# Patient Record
Sex: FEMALE
[De-identification: names, ages, dates, MRNs, and addresses within clinical notes are randomized per-mention and may not be internally consistent; named-entity substitution may affect disease eponyms.]

---

## 2018-06-15 NOTE — EDM.PDOC
ED HPI GENERAL MEDICAL PROBLEM





- General


Chief Complaint: General


Stated Complaint: PAIN UNDER BREAST


Time Seen by Provider: 06/15/18 20:08


Source of Information: Reports: Patient


History Limitations: Reports: No Limitations





- History of Present Illness


INITIAL COMMENTS - FREE TEXT/NARRATIVE: 





HISTORY AND PHYSICAL:





History of present illness:


Patient is a 33-year-old female who presents to the emergency room with 

complaints of pain under the left breast post fall. She states 3 days ago she 

had fallen on the floor, hitting her anterior chest. Since that time has had 

increased pain under the left breast. Pain with palpation to the area and with 

twisting movements. Denies hitting her head or any LOC. She denies any fever, 

chills, chest pain with cough or shortness of breath. Denies any abdominal pain

, nausea, vomiting, diarrhea or constipation.





Review of systems: 


As per history of present illness and below otherwise all systems reviewed and 

negative.





Past medical history: 


As per history of present illness and as reviewed below otherwise 

noncontributory.





Surgical history: 


As per history of present illness and as reviewed below otherwise 

noncontributory.





Social history: 


No reported history of drug or alcohol abuse.





Family history: 


As per history of present illness and as reviewed below otherwise 

noncontributory.





Physical exam:


General: Developed and well-nourished 33-year-old female. Alert and oriented. 

Nontoxic appearing and in no acute distress.


HEENT: Atraumatic, normocephalic, pupils equal and reactive bilaterally, 

negative for conjunctival pallor or scleral icterus, mucous membranes moist, 

throat clear, neck supple, nontender, trachea midline. No drooling or trismus 

noted. No meningeal signs


Lungs: Clear to auscultation, breath sounds equal bilaterally, chest nontender.


Heart: S1S2, regular rate and rhythm without overt murmur


Abdomen: Soft, nondistended, nontender. Negative for masses or 

hepatosplenomegaly. Negative for costovertebral tenderness.


Breast: A breast exam was done with consent. I did explain the exam prior to 

performing. No nipple discharge. There is no open skin, lesions or masses noted 

with manual exam. She does have tenderness under the breast along the chest 

wall on the left.


Pelvis: Stable nontender.


Genitourinary: Deferred.


Rectal: Deferred.


Skin: Intact, warm, dry. No lesions or rashes noted.


Extremities: Atraumatic, negative for cords or calf pain. Neurovascular 

unremarkable.


Neuro: Awake, alert, oriented. Cranial nerves II through XII unremarkable. 

Cerebellum unremarkable. Motor and sensory unremarkable throughout. Exam 

nonfocal.





Notes:


The breast itself does not appear to have any abnormalities. Her pain is with 

palpation under the breast tissue along the anterior left chest wall. Chest x-

ray shows no rib fractures, pneumonia or infiltrate. Norco for night time use, 

and Diclofenac for daytime use was given. Medication education was given. She 

voices understanding and is agreeable to plan of care. She denies any further 

questions at this time.





Diagnostics:


CXR





Therapeutics:


Toradol





Impression: 


Rib contusion





Plan:


1. Continue to take deep breaths/coughing 4-5 x daily to prevent a pneumonia 

from developing.


2. Tylenol and/or ibuprofen as needed for pain management. Norco may be 

prescribed for moderate to severe pain. Please do not take this medication 

while driving or needing to be functioning outside the house. You may ice to 

the area he minutes on 15 minutes off.


3. Follow-up with your primary caregiver in the next 1-2 days. Return to the ED 

as needed and as discussed.





Definitive disposition and diagnosis as appropriate pending reevaluation and 

review of above.





Duration: Day(s):


Location: Reports: Chest





- Related Data


 Allergies











Allergy/AdvReac Type Severity Reaction Status Date / Time


 


No Known Allergies Allergy   Verified 06/15/18 20:10











Home Meds: 


 Home Meds





. [No Known Home Meds]  06/15/18 [History]











Past Medical History





- Past Health History


Medical/Surgical History: Denies Medical/Surgical History





Social & Family History





- Tobacco Use


Smoking Status *Q: Never Smoker





ED ROS GENERAL





- Review of Systems


Review Of Systems: ROS reveals no pertinent complaints other than HPI.





ED EXAM, GENERAL





- Physical Exam


Exam: See Below (See dictation)





Course





- Vital Signs


Last Recorded V/S: 


 Last Vital Signs











Temp  97.3 F   06/15/18 20:14


 


Pulse  111 H  06/15/18 20:14


 


Resp  16   06/15/18 20:14


 


BP  128/72   06/15/18 20:14


 


Pulse Ox  100   06/15/18 20:14














- Orders/Labs/Meds


Orders: 


 Active Orders 24 hr











 Category Date Time Status


 


 Chest 2V [CR] Stat Exams  06/15/18 20:26 Ordered











Meds: 


Medications














Discontinued Medications














Generic Name Dose Route Start Last Admin





  Trade Name Jessie  PRN Reason Stop Dose Admin


 


Ketorolac Tromethamine  60 mg  06/15/18 20:27  06/15/18 20:31





  Toradol  IM  06/15/18 20:28  60 mg





  ONETIME ONE   Administration





     





     





     





     














Departure





- Departure


Time of Disposition: 20:57


Disposition: Home, Self-Care 01


Clinical Impression: 


Rib contusion


Qualifiers:


 Encounter type: initial encounter Laterality: left Qualified Code(s): S20.212A 

- Contusion of left front wall of thorax, initial encounter








- Discharge Information


Referrals: 


PCP,None [Primary Care Provider] - 


Forms:  ED Department Discharge


Additional Instructions: 


The following information is given to patients seen in the emergency department 

who are being discharged to home. This information is to outline your options 

for follow-up care. We provide all patients seen in our emergency department 

with a follow-up referral.





The need for follow-up, as well as the timing and circumstances, are variable 

depending upon the specifics of your emergency department visit.





If you don't have a primary care physician on staff, we will provide you with a 

referral. We always advise you to contact your personal physician following an 

emergency department visit to inform them of the circumstance of the visit and 

for follow-up with them and/or the need for any referrals to a consulting 

specialist.





The emergency department will also refer you to a specialist when appropriate. 

This referral assures that you have the opportunity for follow-up care with a 

specialist. All of these measure are taken in an effort to provide you with 

optimal care, which includes your follow-up.





Under all circumstances we always encourage you to contact your private 

physician who remains a resource for coordinating your care. When calling for 

follow-up care, please make the office aware that this follow-up is from your 

recent emergency room visit. If for any reason you are refused follow-up, 

please contact the [facility] Emergency 

Department at [phone] and asked to speak to the emergency department 

charge nurse.





[facility]


Primary Care


[address] [place] [address]


Phone: [phone]


Fax: [phone]





1. Continue to take deep breaths/coughing 4-5 x daily to prevent a pneumonia 

from developing.


2. Tylenol and/or ibuprofen as needed for pain management. Norco may be 

prescribed for moderate to severe pain. Please do not take this medication 

while driving or needing to be functioning outside the house. You may ice to 

the area he minutes on 15 minutes off.


3. Follow-up with your primary caregiver in the next 1-2 days. Return to the ED 

as needed and as discussed.





- My Orders


Last 24 Hours: 


My Active Orders





06/15/18 20:26


Chest 2V [CR] Stat 














- Assessment/Plan


Last 24 Hours: 


My Active Orders





06/15/18 20:26


Chest 2V [CR] Stat

## 2018-06-18 NOTE — CR
EXAM DATE: 06/15/18



PATIENT'S AGE: 33



Patient: NILA BIANCHI



Facility: [place], [place]

Patient ID: [id number]

Site Patient ID: [id number].

Site Accession #: [id number]

: 1985

Study: XRay Chest [id number]/15/2018 8:50:11 PM

Ordering Physician: Doctor Temp



Final Report: 

INDICATION: LEFT CHEST WALL PAIN POST FALL, NEAR BREAST



TECHNIQUE:

Chest 2 views



COMPARISON:

None 



FINDINGS:

Cardiovascular and mediastinum: Heart size and vasculature are normal in 
caliber and appearance. Mediastinum is within normal limits. 



Lungs and pleural spaces: No focal consolidation. No sign of pleural effusion. 
No pneumothorax. 



Bones and soft tissues: No significant findings. 



IMPRESSION:

No acute cardiopulmonary disease.



Dictated by Jose Flowers MD @ 6/15/2018 8:56:39 PM







Dictated by: Jose Flowers MD @ 06/15/2018 20:58:05

(Electronic Signature)



Report Signed by Proxy.
CHACORTA

## 2019-01-28 NOTE — EDM.PDOC
ED HPI GENERAL MEDICAL PROBLEM





- General


Chief Complaint: General


Stated Complaint: DIZZY, BLOOD PRESSURE ISSUES


Time Seen by Provider: 01/28/19 20:07


Source of Information: Reports: Patient


History Limitations: Reports: No Limitations





- History of Present Illness


INITIAL COMMENTS - FREE TEXT/NARRATIVE: 





HISTORY AND PHYSICAL:





History of present illness:


Patient is a 33-year-old female here with complaint of dizziness that began 

about 1 hour prior to arrival to the ED. She states it has since improved. She 

reports she had dizziness, blurry vision, and chest pain. The chest pain and 

blurry vision resolved and now she is feeling a little dizzy. She states it is 

worse with movement. She denies any recent illness, fevers, chills, nausea, 

vomiting, diarrhea, abdominal pain, headache, or head injury. She denies any 

significant past medical history. 





Review of systems: 


As per history of present illness and below otherwise all systems reviewed and 

negative.





Past medical history: 


As per history of present illness and as reviewed below otherwise 

noncontributory.





Surgical history: 


As per history of present illness and as reviewed below otherwise 

noncontributory.





Social history: 


No reported history of drug or alcohol abuse.





Family history: 


As per history of present illness and as reviewed below otherwise 

noncontributory.





Physical exam:


General: Patient sitting comfortably in no acute distress and nontoxic appearing


HEENT: Atraumatic, normocephalic, pupils reactive, negative for conjunctival 

pallor or scleral icterus, mucous membranes moist, throat clear, neck supple, 

nontender, trachea midline. No meningeal signs. 


Lungs: Clear to auscultation, breath sounds equal bilaterally, chest nontender.


Heart: S1S2, regular, negative for clicks, rubs, or overt murmur.


Abdomen: Soft, nondistended, nontender. Negative for masses or 

hepatosplenomegaly. Negative for costovertebral tenderness.


Pelvis: Stable nontender.


Genitourinary: Deferred.


Rectal: Deferred.


Extremities: Atraumatic, negative for cords or calf pain. Neurovascular 

unremarkable.


Neuro: Awake, alert, oriented. Cranial nerves II through XII unremarkable. 

Cerebellum unremarkable. Motor and sensory unremarkable throughout. Exam 

nonfocal.





Notes: 





Diagnostics:


CBC, CMP, troponin, urine hcg, EKG





Therapeutics:


None 





Prescriptions:


None 





Impression: 


Vertigo





Plan:


1. Follow up with primary care provider


2. Return to ED as needed as discussed 





Definitive disposition and diagnosis as appropriate pending reevaluation and 

review of above.





  ** left shoulder


Pain Score (Numeric/FACES): 2





- Related Data


 Allergies











Allergy/AdvReac Type Severity Reaction Status Date / Time


 


No Known Allergies Allergy   Verified 01/28/19 20:02











Home Meds: 


 Home Meds





. [No Known Home Meds]  06/15/18 [History]











Past Medical History





- Past Health History


Medical/Surgical History: Denies Medical/Surgical History


OB/GYN History: Reports: Pregnancy





- Past Surgical History


Female  Surgical History: Reports: Tubal Ligation





Social & Family History





- Family History


Family Medical History: Noncontributory





- Tobacco Use


Smoking Status *Q: Never Smoker





- Recreational Drug Use


Recreational Drug Use: Yes


Drug Use in Last 12 Months: Yes


Recreational Drug Type: Reports: Marijuana/Hashish


Recreational Drug Use Frequency: Socially





ED ROS GENERAL





- Review of Systems


Review Of Systems: ROS reveals no pertinent complaints other than HPI.





ED EXAM, GENERAL





- Physical Exam


Exam: See Below (see dictation)





Course





- Vital Signs


Last Recorded V/S: 


 Last Vital Signs











Temp  97 F   01/28/19 19:44


 


Pulse  103 H  01/28/19 19:44


 


Resp  18   01/28/19 19:44


 


BP  178/87 H  01/28/19 19:44


 


Pulse Ox  94 L  01/28/19 19:44








 





Orthostatic Blood Pressure [     146/84


Standing]                        


Orthostatic Blood Pressure [     138/78


Sitting]                         


Orthostatic Blood Pressure [     142/88


Supine]                          











- Orders/Labs/Meds


Orders: 


 Active Orders 24 hr











 Category Date Time Status


 


 EKG Documentation Completion [RC] STAT Care  01/28/19 20:06 Active











Labs: 


 Laboratory Tests











  01/28/19 01/28/19 01/28/19 Range/Units





  20:06 20:22 20:22 


 


WBC   7.97   (4.0-11.0)  K/uL


 


RBC   4.69   (4.30-5.90)  M/uL


 


Hgb   13.3   (12.0-16.0)  g/dL


 


Hct   39.3   (36.0-46.0)  %


 


MCV   83.8   (80.0-98.0)  fL


 


MCH   28.4   (27.0-32.0)  pg


 


MCHC   33.8   (31.0-37.0)  g/dL


 


RDW Std Deviation   42.3   (28.0-62.0)  fl


 


RDW Coeff of Dennis   14   (11.0-15.0)  %


 


Plt Count   307   (150-400)  K/uL


 


MPV   10.60   (7.40-12.00)  fL


 


Neut % (Auto)   71.0   (48.0-80.0)  %


 


Lymph % (Auto)   20.7   (16.0-40.0)  %


 


Mono % (Auto)   5.6   (0.0-15.0)  %


 


Eos % (Auto)   2.1   (0.0-7.0)  %


 


Baso % (Auto)   0.6   (0.0-1.5)  %


 


Neut # (Auto)   5.7   (1.4-5.7)  K/uL


 


Lymph # (Auto)   1.7   (0.6-2.4)  K/uL


 


Mono # (Auto)   0.5   (0.0-0.8)  K/uL


 


Eos # (Auto)   0.2   (0.0-0.7)  K/uL


 


Baso # (Auto)   0.1   (0.0-0.1)  K/uL


 


Nucleated RBC %   0.0   /100WBC


 


Nucleated RBCs #   0   K/uL


 


Sodium    140  (136-145)  mmol/L


 


Potassium    4.0  (3.5-5.1)  mmol/L


 


Chloride    103  ()  mmol/L


 


Carbon Dioxide    24.2  (21.0-32.0)  mmol/L


 


BUN    13  (7.0-18.0)  mg/dL


 


Creatinine    0.6  (0.6-1.0)  mg/dL


 


Est Cr Clr Drug Dosing    122.51  mL/min


 


Estimated GFR (MDRD)    > 60.0  ml/min


 


Glucose    130 H  ()  mg/dL


 


Calcium    9.6  (8.5-10.1)  mg/dL


 


Total Bilirubin    0.7  (0.2-1.0)  mg/dL


 


AST    22  (15-37)  IU/L


 


ALT    39  (14-63)  IU/L


 


Alkaline Phosphatase    117 H  ()  U/L


 


Troponin I    < 0.050  (0.000-0.056)  ng/mL


 


Total Protein    8.0  (6.4-8.2)  g/dL


 


Albumin    4.1  (3.4-5.0)  g/dL


 


Globulin    3.9  (2.6-4.0)  g/dL


 


Albumin/Globulin Ratio    1.1  (0.9-1.6)  


 


Urine HCG, Qual  NEGATIVE    (NEGATIVE)  














Departure





- Departure


Time of Disposition: 21:08


Disposition: Home, Self-Care 01


Condition: Good


Clinical Impression: 


 Vertigo








- Discharge Information


Referrals: 


PCP,None [Primary Care Provider] - 


Forms:  ED Department Discharge


Additional Instructions: 


The following information is given to patients seen in the emergency department 

who are being discharged to home. This information is to outline your options 

for follow-up care. We provide all patients seen in our emergency department 

with a follow-up referral.





The need for follow-up, as well as the timing and circumstances, are variable 

depending upon the specifics of your emergency department visit.





If you don't have a primary care physician on staff, we will provide you with a 

referral. We always advise you to contact your personal physician following an 

emergency department visit to inform them of the circumstance of the visit and 

for follow-up with them and/or the need for any referrals to a consulting 

specialist.





The emergency department will also refer you to a specialist when appropriate. 

This referral assures that you have the opportunity for follow-up care with a 

specialist. All of these measure are taken in an effort to provide you with 

optimal care, which includes your follow-up.





Under all circumstances we always encourage you to contact your private 

physician who remains a resource for coordinating your care. When calling for 

follow-up care, please make the office aware that this follow-up is from your 

recent emergency room visit. If for any reason you are refused follow-up, 

please contact the [facility] Emergency 

Department at [phone] and asked to speak to the emergency department 

charge nurse.





[facility]


Primary Care


[address] [place] [id number]


Phone: [phone]


Fax: [phone]





[address] [address]


Phone: [phone]


Fax: [phone]





1. Follow up with primary care provider


2. Return to ED as needed as discussed 





- My Orders


Last 24 Hours: 


My Active Orders





01/28/19 20:06


EKG Documentation Completion [RC] STAT 














- Assessment/Plan


Last 24 Hours: 


My Active Orders





01/28/19 20:06


EKG Documentation Completion [RC] STAT

## 2019-07-14 NOTE — EDM.PDOC
ED HPI GENERAL MEDICAL PROBLEM





- General


Stated Complaint: WITHDRAWL


Time Seen by Provider: 07/14/19 21:55


Source of Information: Reports: Patient





- History of Present Illness


INITIAL COMMENTS - FREE TEXT/NARRATIVE: 





HISTORY AND PHYSICAL:


History of present illness:


[Patient presents with panic and hyperventilation symptoms, she is very anxious 

at current no fever nausea vomiting chills sweats no chest pain shortness 

breath headache dizziness palpitation about a urine symptoms





She was provided 2 mg of Ativan IV currently resting comfortably in no distress

, remains alert





Patient admits to 10-15 shots of hard alcohol daily, she has not had a drink in 

24 hours, no prior withdrawal history


]


Review of systems: 


As per history of present illness and below otherwise all systems reviewed and 

negative.


Past medical history: 


As per history of present illness and as reviewed below otherwise 

noncontributory.


Surgical history: 


As per history of present illness and as reviewed below otherwise 

noncontributory.


Social history: 


No reported history of drug or alcohol abuse.


Family history: 


As per history of present illness and as reviewed below otherwise 

noncontributory.





Physical exam:


HEENT: Atraumatic, normocephalic, pupils reactive, negative for conjunctival 

pallor or scleral icterus, mucous membranes moist, throat clear, neck supple, 

nontender, trachea midline.


Lungs: Clear to auscultation, breath sounds equal bilaterally, chest nontender.


Heart: S1S2, regular, negative for clicks, rubs, or JVD.


Abdomen: Soft, nondistended, nontender. Negative for masses or 

hepatosplenomegaly. Negative for costovertebral tenderness.


Pelvis: Stable nontender.


Genitourinary: Deferred.


Rectal: Deferred.


Extremities: Atraumatic, negative for cords or calf pain. Neurovascular 

unremarkable.


Neuro: Awake, alert, oriented. Cranial nerves II through XII unremarkable. 

Cerebellum unremarkable. Motor and sensory unremarkable throughout. Exam 

nonfocal.





Diagnostics:


[cbc CMP UA hCG


EKG


Chest 1 view


]


Therapeutics:


[ banana bag


Ativan 2 mg IV


]


Impression: 


[ panic attack ]


Alcohol use abuse no history of prior withdrawal


Definitive disposition and diagnosis as appropriate pending reevaluation and 

review of above.





- Related Data


 Allergies











Allergy/AdvReac Type Severity Reaction Status Date / Time


 


No Known Allergies Allergy   Verified 07/14/19 22:07











Home Meds: 


 Home Meds





. [No Known Home Meds]  06/15/18 [History]











Past Medical History





- Past Health History


Medical/Surgical History: Denies Medical/Surgical History


OB/GYN History: Reports: Pregnancy





- Past Surgical History


Female  Surgical History: Reports: Tubal Ligation





Social & Family History





- Family History


Family Medical History: Noncontributory





ED ROS GENERAL





- Review of Systems


Review Of Systems: See Below





ED EXAM, GENERAL





- Physical Exam


Exam: See Below





Course





- Vital Signs


Last Recorded V/S: 


 Last Vital Signs











Temp  96.6 F   07/14/19 21:47


 


Pulse  105 H  07/14/19 21:47


 


Resp  22 H  07/14/19 21:47


 


BP  185/167 H  07/14/19 21:47


 


Pulse Ox  94 L  07/14/19 21:47














- Orders/Labs/Meds


Orders: 


 Active Orders 24 hr











 Category Date Time Status


 


 EKG Documentation Completion [RC] STAT Care  07/14/19 21:55 Active


 


 Chest 1V Frontal [CR] Stat Exams  07/14/19 21:55 Stop Req











Labs: 


 Laboratory Tests











  07/14/19 07/14/19 07/14/19 Range/Units





  22:02 22:02 22:02 


 


WBC  7.26    (4.0-11.0)  K/uL


 


RBC  4.24 L    (4.30-5.90)  M/uL


 


Hgb  12.1    (12.0-16.0)  g/dL


 


Hct  36.4    (36.0-46.0)  %


 


MCV  85.8    (80.0-98.0)  fL


 


MCH  28.5    (27.0-32.0)  pg


 


MCHC  33.2    (31.0-37.0)  g/dL


 


RDW Std Deviation  45.2    (28.0-62.0)  fl


 


RDW Coeff of Dennis  15    (11.0-15.0)  %


 


Plt Count  341    (150-400)  K/uL


 


MPV  10.40    (7.40-12.00)  fL


 


Neut % (Auto)  57.6    (48.0-80.0)  %


 


Lymph % (Auto)  33.6    (16.0-40.0)  %


 


Mono % (Auto)  6.1    (0.0-15.0)  %


 


Eos % (Auto)  2.1    (0.0-7.0)  %


 


Baso % (Auto)  0.6    (0.0-1.5)  %


 


Neut # (Auto)  4.2    (1.4-5.7)  K/uL


 


Lymph # (Auto)  2.4    (0.6-2.4)  K/uL


 


Mono # (Auto)  0.4    (0.0-0.8)  K/uL


 


Eos # (Auto)  0.2    (0.0-0.7)  K/uL


 


Baso # (Auto)  0.0    (0.0-0.1)  K/uL


 


Nucleated RBC %  0.0    /100WBC


 


Nucleated RBCs #  0    K/uL


 


INR    1.04  


 


Sodium   136   (136-145)  mmol/L


 


Potassium   3.3 L   (3.5-5.1)  mmol/L


 


Chloride   100   ()  mmol/L


 


Carbon Dioxide   19.3 L   (21.0-32.0)  mmol/L


 


BUN   7   (7.0-18.0)  mg/dL


 


Creatinine   0.9   (0.6-1.0)  mg/dL


 


Est Cr Clr Drug Dosing   65.21   mL/min


 


Estimated GFR (MDRD)   > 60.0   ml/min


 


Glucose   163 H   ()  mg/dL


 


Calcium   8.1 L   (8.5-10.1)  mg/dL


 


Total Bilirubin   0.7   (0.2-1.0)  mg/dL


 


AST   21   (15-37)  IU/L


 


ALT   29   (14-63)  IU/L


 


Alkaline Phosphatase   89   ()  U/L


 


Total Protein   7.4   (6.4-8.2)  g/dL


 


Albumin   3.9   (3.4-5.0)  g/dL


 


Globulin   3.5   (2.6-4.0)  g/dL


 


Albumin/Globulin Ratio   1.1   (0.9-1.6)  


 


Urine Color     


 


Urine Appearance     


 


Urine pH     (5.0-8.0)  


 


Ur Specific Gravity     (1.001-1.035)  


 


Urine Protein     (NEGATIVE)  mg/dL


 


Urine Glucose (UA)     (NEGATIVE)  mg/dL


 


Urine Ketones     (NEGATIVE)  mg/dL


 


Urine Occult Blood     (NEGATIVE)  


 


Urine Nitrite     (NEGATIVE)  


 


Urine Bilirubin     (NEGATIVE)  


 


Urine Urobilinogen     (<2.0)  EU/dL


 


Ur Leukocyte Esterase     (NEGATIVE)  


 


Urine HCG, Qual     (NEGATIVE)  














  07/14/19 07/14/19 Range/Units





  22:45 22:45 


 


WBC    (4.0-11.0)  K/uL


 


RBC    (4.30-5.90)  M/uL


 


Hgb    (12.0-16.0)  g/dL


 


Hct    (36.0-46.0)  %


 


MCV    (80.0-98.0)  fL


 


MCH    (27.0-32.0)  pg


 


MCHC    (31.0-37.0)  g/dL


 


RDW Std Deviation    (28.0-62.0)  fl


 


RDW Coeff of Dennis    (11.0-15.0)  %


 


Plt Count    (150-400)  K/uL


 


MPV    (7.40-12.00)  fL


 


Neut % (Auto)    (48.0-80.0)  %


 


Lymph % (Auto)    (16.0-40.0)  %


 


Mono % (Auto)    (0.0-15.0)  %


 


Eos % (Auto)    (0.0-7.0)  %


 


Baso % (Auto)    (0.0-1.5)  %


 


Neut # (Auto)    (1.4-5.7)  K/uL


 


Lymph # (Auto)    (0.6-2.4)  K/uL


 


Mono # (Auto)    (0.0-0.8)  K/uL


 


Eos # (Auto)    (0.0-0.7)  K/uL


 


Baso # (Auto)    (0.0-0.1)  K/uL


 


Nucleated RBC %    /100WBC


 


Nucleated RBCs #    K/uL


 


INR    


 


Sodium    (136-145)  mmol/L


 


Potassium    (3.5-5.1)  mmol/L


 


Chloride    ()  mmol/L


 


Carbon Dioxide    (21.0-32.0)  mmol/L


 


BUN    (7.0-18.0)  mg/dL


 


Creatinine    (0.6-1.0)  mg/dL


 


Est Cr Clr Drug Dosing    mL/min


 


Estimated GFR (MDRD)    ml/min


 


Glucose    ()  mg/dL


 


Calcium    (8.5-10.1)  mg/dL


 


Total Bilirubin    (0.2-1.0)  mg/dL


 


AST    (15-37)  IU/L


 


ALT    (14-63)  IU/L


 


Alkaline Phosphatase    ()  U/L


 


Total Protein    (6.4-8.2)  g/dL


 


Albumin    (3.4-5.0)  g/dL


 


Globulin    (2.6-4.0)  g/dL


 


Albumin/Globulin Ratio    (0.9-1.6)  


 


Urine Color  YELLOW   


 


Urine Appearance  CLEAR   


 


Urine pH  7.0   (5.0-8.0)  


 


Ur Specific Gravity  1.010   (1.001-1.035)  


 


Urine Protein  NEGATIVE   (NEGATIVE)  mg/dL


 


Urine Glucose (UA)  NEGATIVE   (NEGATIVE)  mg/dL


 


Urine Ketones  NEGATIVE   (NEGATIVE)  mg/dL


 


Urine Occult Blood  NEGATIVE   (NEGATIVE)  


 


Urine Nitrite  NEGATIVE   (NEGATIVE)  


 


Urine Bilirubin  NEGATIVE   (NEGATIVE)  


 


Urine Urobilinogen  0.2   (<2.0)  EU/dL


 


Ur Leukocyte Esterase  NEGATIVE   (NEGATIVE)  


 


Urine HCG, Qual   NEGATIVE  (NEGATIVE)  











Meds: 


Medications














Discontinued Medications














Generic Name Dose Route Start Last Admin





  Trade Name Freq  PRN Reason Stop Dose Admin


 


Multivitamins/Minerals 10 ml/  1,011.2 mls @ 999 mls/hr  07/14/19 21:53  07/14/ 19 22:49





Thiamine HCl 100 mg/ Folic  IV  07/14/19 22:53  999 mls/hr





Acid 1 mg/ Sodium Chloride  ONETIME ONE   Administration





     





     





     





     


 


Lorazepam  2 mg  07/14/19 21:54  07/14/19 22:20





  Ativan  IVPUSH  07/14/19 21:55  2 mg





  ONETIME ONE   Administration





     





     





     





     














Departure





- Departure


Time of Disposition: 23:09


Disposition: Home, Self-Care 01


Condition: Good


Clinical Impression: 


 Alcohol abuse, Panic attack








- Discharge Information


Referrals: 


PCP,None [Primary Care Provider] - 


Additional Instructions: 


The following information is given to patients seen in the emergency department 

who are being discharged to home. This information is to outline your options 

for follow-up care. We provide all patients seen in our emergency department 

with a follow-up referral.





The need for follow-up, as well as the timing and circumstances, are variable 

depending upon the specifics of your emergency department visit.





If you don't have a primary care physician on staff, we will provide you with a 

referral. We always advise you to contact your personal physician following an 

emergency department visit to inform them of the circumstance of the visit and 

for follow-up with them and/or the need for any referrals to a consulting 

specialist.





The emergency department will also refer you to a specialist when appropriate. 

This referral assures that you have the opportunity for follow-up care with a 

specialist. All of these measure are taken in an effort to provide you with 

optimal care, which includes your follow-up.





Under all circumstances we always encourage you to contact your private 

physician who remains a resource for coordinating your care. When calling for 

follow-up care, please make the office aware that this follow-up is from your 

recent emergency room visit. If for any reason you are refused follow-up, 

please contact the [facility] emergency department at [phone] 

and asked to speak to the emergency department charge nurse.








- My Orders


Last 24 Hours: 


My Active Orders





07/14/19 21:55


EKG Documentation Completion [RC] STAT 


Chest 1V Frontal [CR] Stat 














- Assessment/Plan


Last 24 Hours: 


My Active Orders





07/14/19 21:55


EKG Documentation Completion [RC] STAT 


Chest 1V Frontal [CR] Stat

## 2019-08-04 ENCOUNTER — HOSPITAL ENCOUNTER (EMERGENCY)
Dept: HOSPITAL 56 - MW.ED | Age: 34
Discharge: HOME | End: 2019-08-04
Payer: COMMERCIAL

## 2019-08-04 DIAGNOSIS — S89.92XA: Primary | ICD-10-CM
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
## 2019-08-04 PROCEDURE — 73562 X-RAY EXAM OF KNEE 3: CPT

## 2019-08-04 PROCEDURE — 99283 EMERGENCY DEPT VISIT LOW MDM: CPT

## 2019-08-04 NOTE — CR
Indication:



  Twisting injury 



Technique:



Three views left knee



Comparison:



None



Findings:



Bones: Alignment is normal. No fractures or bone lesions.  



Joint spaces: Unremarkable.  



Soft tissues: Unremarkable.  



Impression:



Negative.



Dictated by Charity Joseph MD @ Aug  4 2019 10:58PM



Signed by Dr. Charity Joseph @ Aug  4 2019 10:59PM

## 2019-08-04 NOTE — EDM.PDOC
ED HPI GENERAL MEDICAL PROBLEM





- General


Stated Complaint: TWISTED ANKLE


Time Seen by Provider: 08/04/19 22:04


Source of Information: Reports: Patient


History Limitations: Reports: No Limitations





- History of Present Illness


INITIAL COMMENTS - FREE TEXT/NARRATIVE: 


HISTORY AND PHYSICAL:





History of present illness:


Patient is a 34-year-old female who presents to the emergency room with 

complaints of left knee pain. She states she was bowling when she twisted and 

heard a "pop" of her left knee. Since then she has not been able to weight bear 

comfortably. Denies hitting her head or having any loss of consciousness. 

Denies any other extremity involvement. Patient denies any fever, chills, 

headache, change in vision, syncope or near syncope. Denies any chest pain, 

back pain, shortness of breath or cough. Denies any GI or  symptoms.





Review of systems: 


As per history of present illness and below otherwise all systems reviewed and 

negative.





Past medical history: 


As per history of present illness and as reviewed below otherwise 

noncontributory.





Surgical history: 


As per history of present illness and as reviewed below otherwise 

noncontributory.





Social history: 


See social history for further information





Family history: 


As per history of present illness and as reviewed below otherwise 

noncontributory.





Physical exam:


General: Well-developed and well-nourished 34-year-old female. Alert and 

oriented. Nontoxic appearing and in no acute distress. Vital signs are stable 

and have been reviewed by me.


HEENT: Atraumatic, normocephalic, pupils equal and reactive bilaterally, 

negative for conjunctival pallor or scleral icterus, mucous membranes moist, 

TMs normal bilaterally, throat clear, neck supple, nontender, trachea midline. 

No drooling or trismus noted. No meningeal signs. No hot potato voice noted. 


Lungs: Clear to auscultation, breath sounds equal bilaterally, chest nontender.


Heart: S1S2, regular rate and rhythm without overt murmur


Abdomen: Soft, nondistended, nontender.


Skin: Intact, warm, dry. No lesions or rashes noted.


Extremities:  Pain with palpation of the medial and lateral left knee. Moves 

all extremities per self without difficulty or deficits, negative for cords or 

calf pain. Strong pedal pulses bilaterally. Denies any numbness or tingling of 

the distal extremity. Neurovascular unremarkable.


Neuro: Awake, alert, oriented. Cranial nerves II through XII unremarkable. 

Cerebellum unremarkable. Motor and sensory unremarkable throughout. Exam 

nonfocal.





Notes:


X-ray shows no acute findings. Patient provided with a knee sleeve and 

crutches. Supportive care measures were reviewed and discussed. Voices 

understanding and is agreeable to plan of care. Denies any further questions or 

concerns at this time.





Diagnostics:


X-ray





Therapeutics:


Tramadol, crutches





Prescription:


None





Impression: 


Left knee injury





Plan:


1. Rest, ice, elevate the affected extremity. Please use crutches as directed.


2. Tylenol and/or Ibuprofen as needed for pain management. 


3. Follow up with the Orthopedic provider as we discussed. Return to the ED as 

needed and as discussed.





Definitive disposition and diagnosis as appropriate pending reevaluation and 

review of above.





  ** Left Knee


Pain Score (Numeric/FACES): 2





- Related Data


 Allergies











Allergy/AdvReac Type Severity Reaction Status Date / Time


 


No Known Allergies Allergy   Verified 08/04/19 22:16











Home Meds: 


 Home Meds





. [No Known Home Meds]  06/15/18 [History]











Past Medical History





- Past Health History


Medical/Surgical History: Denies Medical/Surgical History


HEENT History: Reports: None


Cardiovascular History: Reports: None


Respiratory History: Reports: None


Gastrointestinal History: Reports: None


Genitourinary History: Reports: None


OB/GYN History: Reports: Pregnancy


Musculoskeletal History: Reports: None


Neurological History: Reports: None


Psychiatric History: Reports: None


Endocrine/Metabolic History: Reports: None


Hematologic History: Reports: None


Immunologic History: Reports: None


Oncologic (Cancer) History: Reports: None


Dermatologic History: Reports: None





- Past Surgical History


Female  Surgical History: Reports: Tubal Ligation





Social & Family History





- Family History


Family Medical History: Noncontributory





- Caffeine Use


Caffeine Use: Reports: None





Review of Systems





- Review of Systems


Review Of Systems: ROS reveals no pertinent complaints other than HPI.





ED EXAM, GENERAL





- Physical Exam


Exam: See Below (See dictation)





Course





- Vital Signs


Last Recorded V/S: 


 Last Vital Signs











Temp  97.6 F   08/04/19 22:16


 


Pulse  95   08/04/19 22:16


 


Resp  18   08/04/19 22:16


 


BP  133/86   08/04/19 22:16


 


Pulse Ox  97   08/04/19 22:16














- Orders/Labs/Meds


Orders: 


 Active Orders 24 hr











 Category Date Time Status


 


 Knee 3V Lt [CR] Stat Exams  08/04/19 22:10 Ordered


 


 DME for Discharge [COMM] Stat Oth  08/04/19 22:15 Ordered











Meds: 


Medications














Discontinued Medications














Generic Name Dose Route Start Last Admin





  Trade Name Jessie  PRN Reason Stop Dose Admin


 


Tramadol HCl  50 mg  08/04/19 22:15  08/04/19 22:32





  Ultram  PO  08/04/19 22:16  50 mg





  ONETIME ONE   Administration





     





     





     





     














Departure





- Departure


Time of Disposition: 22:33


Disposition: Home, Self-Care 01


Clinical Impression: 


Left knee injury


Qualifiers:


 Encounter type: initial encounter Qualified Code(s): S89.92XA - Unspecified 

injury of left lower leg, initial encounter








- Discharge Information


Instructions:  Knee Sprain, Adult


Additional Instructions: 


The following information is given to patients seen in the emergency department 

who are being discharged to home. This information is to outline your options 

for follow-up care. We provide all patients seen in our emergency department 

with a follow-up referral.





The need for follow-up, as well as the timing and circumstances, are variable 

depending upon the specifics of your emergency department visit.





If you don't have a primary care physician on staff, we will provide you with a 

referral. We always advise you to contact your personal physician following an 

emergency department visit to inform them of the circumstance of the visit and 

for follow-up with them and/or the need for any referrals to a consulting 

specialist.





The emergency department will also refer you to a specialist when appropriate. 

This referral assures that you have the opportunity for follow-up care with a 

specialist. All of these measure are taken in an effort to provide you with 

optimal care, which includes your follow-up.





Under all circumstances we always encourage you to contact your private 

physician who remains a resource for coordinating your care. When calling for 

follow-up care, please make the office aware that this follow-up is from your 

recent emergency room visit. If for any reason you are refused follow-up, 

please contact the [facility] Emergency 

Department at [phone] and asked to speak to the emergency department 

charge nurse.





[facility]


Primary Care


[address] [place] [address]


Phone: [phone]


Fax: [phone]





[facility]


[address] [place] [address]


Phone: [phone]


Fax: [phone]





1. Rest, ice, elevate the affected extremity. Please use crutches as directed.


2. Tylenol and/or Ibuprofen as needed for pain management. 


3. Follow up with the Orthopedic provider as we discussed. Return to the ED as 

needed and as discussed.





- My Orders


Last 24 Hours: 


My Active Orders





08/04/19 22:10


Knee 3V Lt [CR] Stat 





08/04/19 22:15


DME for Discharge [COMM] Stat 














- Assessment/Plan


Last 24 Hours: 


My Active Orders





08/04/19 22:10


Knee 3V Lt [CR] Stat 





08/04/19 22:15


DME for Discharge [COMM] Stat